# Patient Record
Sex: FEMALE | Race: ASIAN
[De-identification: names, ages, dates, MRNs, and addresses within clinical notes are randomized per-mention and may not be internally consistent; named-entity substitution may affect disease eponyms.]

---

## 2018-03-14 ENCOUNTER — HOSPITAL ENCOUNTER (OUTPATIENT)
Dept: HOSPITAL 89 - MAMO | Age: 58
End: 2018-03-14
Attending: INTERNAL MEDICINE
Payer: COMMERCIAL

## 2018-03-14 DIAGNOSIS — Z12.31: Primary | ICD-10-CM

## 2018-03-14 PROCEDURE — 77063 BREAST TOMOSYNTHESIS BI: CPT

## 2018-03-14 PROCEDURE — 77067 SCR MAMMO BI INCL CAD: CPT

## 2018-03-16 NOTE — RADIOLOGY IMAGING REPORT
FACILITY: [facility] [place] 

 

PATIENT NAME: JUANJO VALERIO

: [id number]

MR: [id number]

V: [id number]

EXAM DATE: 

ORDERING PHYSICIAN: HERI MONTANA

TECHNOLOGIST: Alexandria Lindsay

 

PROCEDURE:BILATERAL DIGITAL SCREENING MAMMOGRAM WITH CAD ASSISTED 

INTERPRETATION & 3D TOMOSYNTHESIS 

 

COMPARISON:Prior mammograms 17, 16, 14, 13, 

12, 11/10/11.

 

INDICATIONS:SCREENING

 

FINDINGS: 

Dense heterogeneous fibroglandular tissue is seen throughout the 

breasts. The parenchymal pattern has remained stable allowing for 

difference in mammographic technique & patient positioning. There is 

no evidence of malignant appearing mass, malignant appearing 

calcifications or other secondary sign of malignancy in either breast.

  

DIAGNOSTIC CATEGORY 1--NEGATIVE.  

 

RECOMMENDATIONS:

ROUTINE MAMMOGRAM AND CLINICAL EVALUATION.   

 

IMPRESSION:

BIRADS 1: Negative 

No significant abnormality is seen

 

 

 

 

 

 

 

 

 

Dictated by:  Mary Kay Rucker M.D. on 3/14/2018 at 14:07   

Transcribed by: FIX on 3/14/2018 at 14:15    

Approved by:  Mary Kay Rucker M.D. on 3/16/2018 at 10:16   

[facility]

## 2018-08-29 ENCOUNTER — HOSPITAL ENCOUNTER (OUTPATIENT)
Dept: HOSPITAL 89 - AUD | Age: 58
End: 2018-08-29
Attending: INTERNAL MEDICINE
Payer: COMMERCIAL

## 2018-08-29 DIAGNOSIS — H90.3: Primary | ICD-10-CM

## 2018-08-29 PROCEDURE — 92570 ACOUSTIC IMMITANCE TESTING: CPT

## 2018-08-29 PROCEDURE — 92557 COMPREHENSIVE HEARING TEST: CPT

## 2018-09-05 ENCOUNTER — HOSPITAL ENCOUNTER (OUTPATIENT)
Dept: HOSPITAL 89 - US | Age: 58
LOS: 2 days | Discharge: HOME | End: 2018-09-07
Attending: INTERNAL MEDICINE
Payer: COMMERCIAL

## 2018-09-05 DIAGNOSIS — I51.7: ICD-10-CM

## 2018-09-05 DIAGNOSIS — R90.89: Primary | ICD-10-CM

## 2018-09-05 DIAGNOSIS — I36.1: ICD-10-CM

## 2018-09-05 PROCEDURE — 93880 EXTRACRANIAL BILAT STUDY: CPT

## 2018-09-05 PROCEDURE — 93306 TTE W/DOPPLER COMPLETE: CPT

## 2018-09-05 NOTE — RADIOLOGY IMAGING REPORT
FACILITY: [facility] 

 

PATIENT NAME: Carmenza Sneed

: 1960

MR: [id number]

V: [id number]

EXAM DATE: 

ORDERING PHYSICIAN: HERI MONTANA

TECHNOLOGIST: 

 

Location: [facility]

Patient: aCrmenza Sneed

: 1960

MRN: [id number]

Visit/Account:[id number]

Date of Sevice:  2018

 

ACCESSION #: [id number]

 

CAROTID

 

HISTORY:  Abnormal brain MRI showing small vessel disease

 

COMPARISON:  Brain MR 2017

 

FINDINGS:

Grayscale, duplex and color Doppler interrogation of the extracranial carotid and vertebral arteries 
was performed bilateral.

 

On the right, peak systolic velocities within the common and internal carotid arteries are 101 and 97
 cm/sec respectively.  There is a very small amount of plaque identified the carotid bulbs bilaterall
y.  Antegrade flow within the common, internal and external carotid arteries as well as vertebral art
artemio. ICA/CCA ratio 1.1.

 

On the left, peak systolic velocities within the common and internal carotid arteries are 119 and 103
 cm/sec respectively.  There is a very small amount of plaque identified at the carotid bulbs bilater
ally.  Antegrade flow within the common, internal and external carotid arteries as well as vertebral 
artery. ICA/CCA ratio 0.9.

 

IMPRESSION:

Very small amount of plaque identified the carotid bulbs bilaterally although no hemodynamically sign
ificant lesions seen by velocity criteria

 

Velocity criteria are extrapolated from diameter data as defined by the Society of Radiologists in [place]
[facility] Consensus Conference Radiology 2003; 229;340-346

 

Report Dictated By: Mary Kay Rucker MD at 2018 11:37 AM

 

Report E-Signed By: Mary Kay Rucker MD  at 2018 11:38 AM

 

WSN:[id number]

## 2019-06-12 ENCOUNTER — HOSPITAL ENCOUNTER (OUTPATIENT)
Dept: HOSPITAL 89 - MAMO | Age: 59
End: 2019-06-12
Attending: INTERNAL MEDICINE
Payer: COMMERCIAL

## 2019-06-12 DIAGNOSIS — Z12.31: Primary | ICD-10-CM

## 2019-06-12 PROCEDURE — 77063 BREAST TOMOSYNTHESIS BI: CPT

## 2019-06-12 PROCEDURE — 77067 SCR MAMMO BI INCL CAD: CPT

## 2019-06-12 NOTE — RADIOLOGY IMAGING REPORT
FACILITY: [facility] 

 

PATIENT NAME: JUANJO VALERIO

: [id number]

MR: [id number]

V: [id number]

EXAM DATE: [id number]

ORDERING PHYSICIAN: HERI MONTANA

TECHNOLOGIST: Alexandria Lindsay

 

PROCEDURE: BILATERAL DIGITAL SCREENING MAMMOGRAM WITH CAD ASSISTED 

INTERPRETATION & 3D TOMOSYNTHESIS 

 

REASON FOR STUDY: Screening. 

 

FAMILY HISTORY OF BREAST CANCER: None. 

 

BREAST PROCEDURES/TREATMENTS:  Benign surgical biopsy of the Left 

breast.

 

COMPARISON: 3/14/18, 17, 16, 14, 13, 12. 

 

VIEWS OBTAINED: 2D & 3D full field CC & MLO & bilateral 2D XCC. 

 

BREAST DENSITY:  

The breasts are heterogeneously dense which can obscure small masses.

 

MAMMOGRAM FINDINGS:

The parenchymal pattern has remained stable allowing for difference in 

mammographic technique & patient positioning.

 

IMPRESSION: 

BIRADS 1: Negative.

 

DIAGNOSTIC CATEGORY 1--NEGATIVE.  

 

 

RECOMMENDATIONS:

ROUTINE MAMMOGRAM AND CLINICAL EVALUATION.   

 

Dictated by:  Mary Kay Rucker M.D. on 2019 at 10:09   

Transcribed by: FIX on 2019 at 10:20    

Approved by:  Mary Kay Rucker M.D. on 2019 at 10:47   

[facility]